# Patient Record
Sex: MALE | Race: WHITE | NOT HISPANIC OR LATINO | Employment: UNEMPLOYED | URBAN - METROPOLITAN AREA
[De-identification: names, ages, dates, MRNs, and addresses within clinical notes are randomized per-mention and may not be internally consistent; named-entity substitution may affect disease eponyms.]

---

## 2021-04-26 ENCOUNTER — PATIENT MESSAGE (OUTPATIENT)
Dept: RESEARCH | Facility: HOSPITAL | Age: 51
End: 2021-04-26

## 2022-06-24 ENCOUNTER — TELEPHONE (OUTPATIENT)
Dept: HEPATOLOGY | Facility: CLINIC | Age: 52
End: 2022-06-24
Payer: MEDICAID

## 2022-06-24 PROBLEM — B19.20 HEPATITIS C TEST POSITIVE: Chronic | Status: ACTIVE | Noted: 2022-06-24

## 2022-06-24 NOTE — TELEPHONE ENCOUNTER
Dr. Jose Khan ordered that patient be scheduled for a hep c consult visit.  Attempt made to schedule patient to see PA Scheuermann. LVM asking that he call hepatology back for scheduling.

## 2022-06-24 NOTE — TELEPHONE ENCOUNTER
----- Message from Shirin Webber RN sent at 6/24/2022  2:07 PM CDT -----  Contact: @[phone]  Yuki Vela,     Should he be scheduled in [facility] or with [facility]?    Thanks,  Shirin  ----- Message -----  From: Mariel Real  Sent: 6/24/2022   1:42 PM CDT  To: Mel Mccall Staff    Pt requesting a call back to schedule a consult visit.  Pt has a referral in the system that is pending review.  Pt would like to be seen at the [facility] location.  Please call to discuss further.

## 2022-06-27 ENCOUNTER — TELEPHONE (OUTPATIENT)
Dept: HEPATOLOGY | Facility: CLINIC | Age: 52
End: 2022-06-27
Payer: MEDICAID

## 2022-06-27 NOTE — TELEPHONE ENCOUNTER
Dr. Jose Khan ordered that patient be scheduled for a hepatology consult visit.  I spoke with patient.  Appt with PA Scheuermann scheduled 7/22/22; appt reminder notice mailed.

## 2022-07-22 ENCOUNTER — PROCEDURE VISIT (OUTPATIENT)
Dept: HEPATOLOGY | Facility: CLINIC | Age: 52
End: 2022-07-22
Payer: MEDICAID

## 2022-07-22 ENCOUNTER — OFFICE VISIT (OUTPATIENT)
Dept: HEPATOLOGY | Facility: CLINIC | Age: 52
End: 2022-07-22
Payer: MEDICAID

## 2022-07-22 ENCOUNTER — LAB VISIT (OUTPATIENT)
Dept: LAB | Facility: HOSPITAL | Age: 52
End: 2022-07-22
Payer: MEDICAID

## 2022-07-22 VITALS
TEMPERATURE: 99 F | BODY MASS INDEX: 27.82 KG/M2 | SYSTOLIC BLOOD PRESSURE: 121 MMHG | HEIGHT: 67 IN | HEART RATE: 88 BPM | RESPIRATION RATE: 18 BRPM | DIASTOLIC BLOOD PRESSURE: 75 MMHG | WEIGHT: 177.25 LBS | OXYGEN SATURATION: 95 %

## 2022-07-22 DIAGNOSIS — B18.2 CHRONIC HEPATITIS C WITHOUT HEPATIC COMA: ICD-10-CM

## 2022-07-22 DIAGNOSIS — B18.2 CHRONIC HEPATITIS C WITHOUT HEPATIC COMA: Primary | ICD-10-CM

## 2022-07-22 DIAGNOSIS — B19.20 HEPATITIS C TEST POSITIVE: ICD-10-CM

## 2022-07-22 PROCEDURE — 99203 OFFICE O/P NEW LOW 30 MIN: CPT | Mod: S$PBB,,, | Performed by: PHYSICIAN ASSISTANT

## 2022-07-22 PROCEDURE — 3008F BODY MASS INDEX DOCD: CPT | Mod: CPTII,,, | Performed by: PHYSICIAN ASSISTANT

## 2022-07-22 PROCEDURE — 3078F DIAST BP <80 MM HG: CPT | Mod: CPTII,,, | Performed by: PHYSICIAN ASSISTANT

## 2022-07-22 PROCEDURE — 3074F SYST BP LT 130 MM HG: CPT | Mod: CPTII,,, | Performed by: PHYSICIAN ASSISTANT

## 2022-07-22 PROCEDURE — 87340 HEPATITIS B SURFACE AG IA: CPT | Performed by: PHYSICIAN ASSISTANT

## 2022-07-22 PROCEDURE — 3074F PR MOST RECENT SYSTOLIC BLOOD PRESSURE < 130 MM HG: ICD-10-PCS | Mod: CPTII,,, | Performed by: PHYSICIAN ASSISTANT

## 2022-07-22 PROCEDURE — 99214 OFFICE O/P EST MOD 30 MIN: CPT | Mod: PBBFAC | Performed by: PHYSICIAN ASSISTANT

## 2022-07-22 PROCEDURE — 99999 PR PBB SHADOW E&M-EST. PATIENT-LVL IV: ICD-10-PCS | Mod: PBBFAC,,, | Performed by: PHYSICIAN ASSISTANT

## 2022-07-22 PROCEDURE — 36415 COLL VENOUS BLD VENIPUNCTURE: CPT | Performed by: PHYSICIAN ASSISTANT

## 2022-07-22 PROCEDURE — 1160F PR REVIEW ALL MEDS BY PRESCRIBER/CLIN PHARMACIST DOCUMENTED: ICD-10-PCS | Mod: CPTII,,, | Performed by: PHYSICIAN ASSISTANT

## 2022-07-22 PROCEDURE — 1159F PR MEDICATION LIST DOCUMENTED IN MEDICAL RECORD: ICD-10-PCS | Mod: CPTII,,, | Performed by: PHYSICIAN ASSISTANT

## 2022-07-22 PROCEDURE — 99203 PR OFFICE/OUTPT VISIT, NEW, LEVL III, 30-44 MIN: ICD-10-PCS | Mod: S$PBB,,, | Performed by: PHYSICIAN ASSISTANT

## 2022-07-22 PROCEDURE — 80321 ALCOHOLS BIOMARKERS 1OR 2: CPT | Performed by: PHYSICIAN ASSISTANT

## 2022-07-22 PROCEDURE — 91200 LIVER ELASTOGRAPHY: CPT | Mod: 26,S$PBB,, | Performed by: PHYSICIAN ASSISTANT

## 2022-07-22 PROCEDURE — 86706 HEP B SURFACE ANTIBODY: CPT | Performed by: PHYSICIAN ASSISTANT

## 2022-07-22 PROCEDURE — 86704 HEP B CORE ANTIBODY TOTAL: CPT | Performed by: PHYSICIAN ASSISTANT

## 2022-07-22 PROCEDURE — 99999 PR PBB SHADOW E&M-EST. PATIENT-LVL IV: CPT | Mod: PBBFAC,,, | Performed by: PHYSICIAN ASSISTANT

## 2022-07-22 PROCEDURE — 3008F PR BODY MASS INDEX (BMI) DOCUMENTED: ICD-10-PCS | Mod: CPTII,,, | Performed by: PHYSICIAN ASSISTANT

## 2022-07-22 PROCEDURE — 86790 VIRUS ANTIBODY NOS: CPT | Performed by: PHYSICIAN ASSISTANT

## 2022-07-22 PROCEDURE — 91200 LIVER ELASTOGRAPHY: CPT | Mod: PBBFAC | Performed by: PHYSICIAN ASSISTANT

## 2022-07-22 PROCEDURE — 4010F ACE/ARB THERAPY RXD/TAKEN: CPT | Mod: CPTII,,, | Performed by: PHYSICIAN ASSISTANT

## 2022-07-22 PROCEDURE — 3044F PR MOST RECENT HEMOGLOBIN A1C LEVEL <7.0%: ICD-10-PCS | Mod: CPTII,,, | Performed by: PHYSICIAN ASSISTANT

## 2022-07-22 PROCEDURE — 3078F PR MOST RECENT DIASTOLIC BLOOD PRESSURE < 80 MM HG: ICD-10-PCS | Mod: CPTII,,, | Performed by: PHYSICIAN ASSISTANT

## 2022-07-22 PROCEDURE — 4010F PR ACE/ARB THEARPY RXD/TAKEN: ICD-10-PCS | Mod: CPTII,,, | Performed by: PHYSICIAN ASSISTANT

## 2022-07-22 PROCEDURE — 1159F MED LIST DOCD IN RCRD: CPT | Mod: CPTII,,, | Performed by: PHYSICIAN ASSISTANT

## 2022-07-22 PROCEDURE — 3044F HG A1C LEVEL LT 7.0%: CPT | Mod: CPTII,,, | Performed by: PHYSICIAN ASSISTANT

## 2022-07-22 PROCEDURE — 91200 FIBROSCAN (VIBRATION CONTROLLED TRANSIENT ELASTOGRAPHY): ICD-10-PCS | Mod: 26,S$PBB,, | Performed by: PHYSICIAN ASSISTANT

## 2022-07-22 PROCEDURE — 1160F RVW MEDS BY RX/DR IN RCRD: CPT | Mod: CPTII,,, | Performed by: PHYSICIAN ASSISTANT

## 2022-07-22 NOTE — PROGRESS NOTES
"HEPATOLOGY CLINIC VISIT NOTE - HCV clinic  REFERRING PROVIDER: Jose Khan MD  CHIEF COMPLAINT: Hepatitis C   (accompanied by: wife Misti)    HISTORY       This is a 52 y.o. White male with chronic hepatitis C, here for further eval / mngmt.     HCV history:  Recently diagnosed  Risks for HCV date back to age 13 (blood transfusions)  - Treatment naive  - Genotype ?  - HCV RNA >15 mill IU/mL - 6/2022    Liver staging:  No formal liver staging   No liver imaging   Labs reveal no obvious evidence of advanced fibrosis; well preserved liver function      Denies jaundice, dark urine, abdominal distention, hematemesis, melena, slowed mentation.   No abnormal skin rashes. No generalized joint pain.     PMH, PSH, SOCIAL HX, FAMILY HX      Reviewed in Epic  Pertinent findings:  FAMILY HX: Unknown - adopted  SOCIAL HX: , resides in [place]  Alcohol - regular/heavy "all his life." Would like to quit. Has realized he needs help to do this      ROS:   Review of Systems   Respiratory: Negative for shortness of breath.    Cardiovascular: Negative for chest pain and leg swelling.   Gastrointestinal: Negative for abdominal pain.   Musculoskeletal: Positive for joint pain.   Skin: Negative for rash.       PHYSICAL EXAM:  Friendly White male, in no acute distress; alert and oriented to person, place and time  VITALS: reviewed  HEENT: Sclerae anicteric.   NECK: Supple  CVS: Regular rate and rhythm. No murmurs  LUNGS: Normal respiratory effort. Clear bilaterally  ABDOMEN: Flat, soft, nontender. No organomegaly or masses. No ascites or hernias. Good bowel sounds.    SKIN: Warm and dry. No jaundice, No obvious rashes.   EXTREMITIES: No lower extremity edema  NEURO/PSYCH: Normal gate. Memory intact. Thought and speech pattern appropriate. Behavior normal. No depression or anxiety noted.    PERTINENT DIAGNOSTIC RESULTS      Lab Results   Component Value Date    WBC 4.5 03/11/2022    HGB 13.6 03/11/2022     03/11/2022 "     No results found for: INR  Lab Results   Component Value Date    AST 29 03/11/2022    ALT 20 03/11/2022    BILITOT 0.4 03/11/2022    ALBUMIN 4.3 03/11/2022    ALKPHOS 54 (L) 09/29/2021    CREATININE 0.99 03/11/2022    BUN 13 03/11/2022     03/11/2022    K 4.5 03/11/2022       ASSESSMENT        52 y.o. White male with:  1. CHRONIC HEPATITIS C, GENOTYPE ? - treatment naive  -- Unknown Immunity to HAV & HBV      PLAN        1. Labs today  2. FibroScan today  3. U/S   4. F/U visit in few weeks. Goal of antiviral therapy    Orders Placed This Encounter   Procedures    FibroScan (Vibration Controlled Transient Elastography)    US Abdomen Complete    Hepatitis B Surface Antigen    Hepatitis B Surface Ab, Qualitative    Hepatitis B Core Antibody, Total    Hepatitis A antibody, IgG    Phosphatidylethanol (PETH)     5. Recommended pt return to PCP to discuss his desire to quit drinking. Recommended he consider AA or rehab as well.   ___________________________________________________________________  EDUCATION:  The natural history of Hepatitis C, including potential progression to cirrhosis was reviewed. We discussed the increased progression of liver disease secondary to alcohol use; patient was advised to avoid alcohol completely.     Transmission of Hepatitis C was reviewed, including possible sexual transmission. Sexual contacts should be screened. Patient should avoid sharing personal products such as razors, toothbrushes, etc.     Recommend avoiding raw seafood.  Limit acetaminophen to 2000mg daily.  __________________________________________________________________    Duration of encounter: 32 MIN  This includes face-to-face time and non face-to-face time preparing to see the patient (eg, review of tests), obtaining and/or reviewing separately obtained history, documenting clinical information in the electronic or other health record, independently interpreting resultsand communicating results to the  patient/family/caregiver, or care coordination.

## 2022-07-22 NOTE — PROCEDURES
FibroScan (Vibration Controlled Transient Elastography)    Date/Time: 7/22/2022 3:00 PM  Performed by: Jennifer B. Scheuermann, PA  Authorized by: Jennifer B. Scheuermann, PA     Diagnosis:  HCV    Probe:  M    Universal Protocol: Patient's identity, procedure and site were verified, confirmatory pause was performed.  Discussed procedure including risks and potential complications.  Questions answered.  Patient verbalizes understanding and wishes to proceed with VCTE.     Procedure: After providing explanations of the procedure, patient was placed in the supine position with right arm in maximum abduction to allow optimal exposure of right lateral abdomen.  Patient was briefly assessed, Testing was performed in the mid-axillary location, 50Hz Shear Wave pulses were applied and the resulting Shear Wave and Propagation Speed detected with a 3.5 MHz ultrasonic signal, using the FibroScan probe, Skin to liver capsule distance and liver parenchyma were accessed during the entire examination with the FibroScan probe, Patient was instructed to breathe normally and to abstain from sudden movements during the procedure, allowing for random measurements of liver stiffness. At least 10 Shear Waves were produced, Individual measurements of each Shear Wave were calculated.  Patient tolerated the procedure well with no complications.  Meets discharge criteria as was dismissed.  Rates pain 0 out of 10.  Patient will follow up with ordering provider to review results.      Findings  Median liver stiffness score:  4.8  CAP Reading: dB/m:  212    IQR/med %:  19  Interpretation  Fibrosis interpretation is based on medial liver stiffness - Kilopascal (kPa).    Fibrosis Stage:  F 0-1  Steatosis interpretation is based on controlled attenuation parameter - (dB/m).    Steatosis Grade:  <S1

## 2022-07-22 NOTE — PATIENT INSTRUCTIONS
Hello again! Nice meeting you today!    Due to your HCV infection, the following things are recommended:  AVOID ALL alcohol (includes beer, wine and liquor)  Tylenol/acetaminophen is OKAY for pain, but no more than 2000 mg per day (up to 4 extra strength)  NO RAW SEAFOOD (sushi, raw oysters) due to the risk of infection    Do not share things that could cut you, such as a razor or toothbrush.  Sexual partners should be screened for HCV.      Plan:  Blood work  Ultrasound  Fibroscan   Follow up video visit    Hopefully we'll have all the info we need to talk about HCV treatment at your next visit!      Jen Scheuermann, PA-C  Hepatology - HCV Clinic

## 2022-07-26 LAB
HAV IGG SER QL IA: NEGATIVE
HBV CORE AB SERPL QL IA: NEGATIVE
HBV SURFACE AB SER-ACNC: POSITIVE M[IU]/ML
HBV SURFACE AG SERPL QL IA: NEGATIVE

## 2022-07-27 LAB
PETH 16:0/18.1 (POPETH): 523 NG/ML
PETH 16:0/18.2 (PLPETH): 332 NG/ML

## 2022-08-08 ENCOUNTER — TELEPHONE (OUTPATIENT)
Dept: HEPATOLOGY | Facility: CLINIC | Age: 52
End: 2022-08-08
Payer: MEDICAID

## 2022-08-08 DIAGNOSIS — K76.9 LIVER DISEASE, UNSPECIFIED: ICD-10-CM

## 2022-08-08 DIAGNOSIS — K76.9 LIVER LESION: ICD-10-CM

## 2022-08-08 DIAGNOSIS — B18.2 CHRONIC HEPATITIS C WITHOUT HEPATIC COMA: Primary | ICD-10-CM

## 2022-08-08 NOTE — TELEPHONE ENCOUNTER
U/S 8/8/22 - mildly complicated liver cyst, 1.2cm    pls call pt  Tell him his ultrasound showed a cyst on his liver.  It does not look alarming, but we need to evaluate it more closely  Schedule tpCT and AFP    Keep upcoming appt w/ me    thanks

## 2022-08-22 ENCOUNTER — TELEPHONE (OUTPATIENT)
Dept: HEPATOLOGY | Facility: CLINIC | Age: 52
End: 2022-08-22
Payer: MEDICAID

## 2022-08-26 ENCOUNTER — TELEPHONE (OUTPATIENT)
Dept: HEPATOLOGY | Facility: CLINIC | Age: 52
End: 2022-08-26
Payer: MEDICAID

## 2022-08-26 NOTE — TELEPHONE ENCOUNTER
Attempt made to reach patient at all numbers listed. I LVM informing him that I noted that he rescheduled testing.  F/u visit with PA Scheuermann moved from 8/29/22 to 9/23/22; appt reminder notice mailed.

## 2022-08-26 NOTE — TELEPHONE ENCOUNTER
"----- Message from Rhona Montejo MA sent at 8/25/2022  4:44 PM CDT -----  Regarding: FW: speak with office  Contact: mika    ----- Message -----  From: Zahida Epstein  Sent: 8/25/2022   3:17 PM CDT  To: Scheuermann Jennifer B Staff  Subject: speak with office                                Consult/Advisory:           Name Of Caller:mika    Contact Preference?:[phone]         Does patient feel the need to be seen today?no           What is the nature of the call?:speak with office about changing appt            Additional Notes:  "Thank you for all that you do for our patients'"       "

## 2022-08-29 ENCOUNTER — TELEPHONE (OUTPATIENT)
Dept: HEPATOLOGY | Facility: CLINIC | Age: 52
End: 2022-08-29
Payer: MEDICAID

## 2022-08-29 DIAGNOSIS — B18.2 CHRONIC HEPATITIS C WITHOUT HEPATIC COMA: Primary | ICD-10-CM

## 2022-08-29 NOTE — TELEPHONE ENCOUNTER
8/29/22  Cr 1.5, CT benign liver cyst    Pls call pt:  CT showed a plain liver cyst noted but no alarming masses or tumors in the liver.  Lab showed his kidney number is up.  VERY important he increase fluids for next few days - goal of 70 oz daily.    Schedule repeat BMP in a week    Keep appt w/ me in Sept thxx

## 2022-08-31 ENCOUNTER — PATIENT MESSAGE (OUTPATIENT)
Dept: HEPATOLOGY | Facility: CLINIC | Age: 52
End: 2022-08-31
Payer: MEDICAID

## 2022-08-31 NOTE — TELEPHONE ENCOUNTER
Attempt made to reach patient.  Msg from PA Scheuermann left on his VM and mailed to him.  Msg also sent via [place].  Lab draw scheduled 9/7/22; appt reminder notice mailed.

## 2022-09-02 ENCOUNTER — OFFICE VISIT (OUTPATIENT)
Dept: HEPATOLOGY | Facility: CLINIC | Age: 52
End: 2022-09-02
Payer: MEDICAID

## 2022-09-02 ENCOUNTER — PATIENT MESSAGE (OUTPATIENT)
Dept: HEPATOLOGY | Facility: CLINIC | Age: 52
End: 2022-09-02

## 2022-09-02 DIAGNOSIS — B18.2 CHRONIC HEPATITIS C WITHOUT HEPATIC COMA: Primary | ICD-10-CM

## 2022-09-02 PROCEDURE — 1159F MED LIST DOCD IN RCRD: CPT | Mod: CPTII,95,, | Performed by: PHYSICIAN ASSISTANT

## 2022-09-02 PROCEDURE — 3044F PR MOST RECENT HEMOGLOBIN A1C LEVEL <7.0%: ICD-10-PCS | Mod: CPTII,95,, | Performed by: PHYSICIAN ASSISTANT

## 2022-09-02 PROCEDURE — 3044F HG A1C LEVEL LT 7.0%: CPT | Mod: CPTII,95,, | Performed by: PHYSICIAN ASSISTANT

## 2022-09-02 PROCEDURE — 99214 OFFICE O/P EST MOD 30 MIN: CPT | Mod: 95,,, | Performed by: PHYSICIAN ASSISTANT

## 2022-09-02 PROCEDURE — 1160F PR REVIEW ALL MEDS BY PRESCRIBER/CLIN PHARMACIST DOCUMENTED: ICD-10-PCS | Mod: CPTII,95,, | Performed by: PHYSICIAN ASSISTANT

## 2022-09-02 PROCEDURE — 99214 PR OFFICE/OUTPT VISIT, EST, LEVL IV, 30-39 MIN: ICD-10-PCS | Mod: 95,,, | Performed by: PHYSICIAN ASSISTANT

## 2022-09-02 PROCEDURE — 4010F ACE/ARB THERAPY RXD/TAKEN: CPT | Mod: CPTII,95,, | Performed by: PHYSICIAN ASSISTANT

## 2022-09-02 PROCEDURE — 4010F PR ACE/ARB THEARPY RXD/TAKEN: ICD-10-PCS | Mod: CPTII,95,, | Performed by: PHYSICIAN ASSISTANT

## 2022-09-02 PROCEDURE — 1159F PR MEDICATION LIST DOCUMENTED IN MEDICAL RECORD: ICD-10-PCS | Mod: CPTII,95,, | Performed by: PHYSICIAN ASSISTANT

## 2022-09-02 PROCEDURE — 1160F RVW MEDS BY RX/DR IN RCRD: CPT | Mod: CPTII,95,, | Performed by: PHYSICIAN ASSISTANT

## 2022-09-02 RX ORDER — VELPATASVIR AND SOFOSBUVIR 100; 400 MG/1; MG/1
1 TABLET, FILM COATED ORAL DAILY
Qty: 28 TABLET | Refills: 2 | Status: SHIPPED | OUTPATIENT
Start: 2022-09-02 | End: 2023-03-08 | Stop reason: ALTCHOICE

## 2022-09-02 NOTE — PROGRESS NOTES
"HEPATOLOGY VISIT NOTE - HCV clinic  The patient location is: [place]  Visit type: audiovisual    Each patient to whom he or she provides medical services by telemedicine is:  (1) informed of the relationship between the physician and patient and the respective role of any other health care provider with respect to management of the patient; and (2) notified that he or she may decline to receive medical services by telemedicine and may withdraw from such care at any time.    CHIEF COMPLAINT: Hepatitis C       HISTORY       This is a 52 y.o. White male with chronic hepatitis C and regular alcohol use    Interval history:  Being seen for f/u w/ additional labs, imaging, liver staging.  (+) immunity to HBV (prior vaccination, neg HBcAb)  Lacking immunity to HAV  Peth 7/22/22  No evidence of advanced fibrosis.    U/S 7/2022 - 1.2cm mildly complicated liver cyst  tpCT 8/29/22 - 1.1cm benign liver cyst  AFP normal  *Of note CR 1.5 day of CT, has been advised to increase fluids and have repeat BMP next week. 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    Has upcoming appt w/ PCP, plans to discuss his desire to quit alcohol    Feels well  Motivated to have HCV treated  Denies jaundice, dark urine, hematemesis, melena, slowed mentation, abdominal distention.       HCV history:  Recently diagnosed  Risks for HCV date back to age 13 (blood transfusions)  - Treatment naive  - Genotype ?  - HCV RNA >15 mill IU/mL - 6/2022    Liver staging:  FibroScan 7/2022 - kPa 4.8 (F0-1), (, no steatosis)  Labs and imaging support staging        PMH, PSH, SOCIAL HX, FAMILY HX      Reviewed in Epic  Pertinent findings:  FAMILY HX: Unknown - adopted  SOCIAL HX: , resides in [place]  Alcohol - regular/heavy "all his life." Would like to quit. Has realized he needs help to do this      ROS:   Review of Systems   Respiratory:  Negative for shortness of breath.    Cardiovascular:  Negative for chest pain and leg swelling.   Gastrointestinal:  " Negative for abdominal pain.   Musculoskeletal:  Positive for back pain and joint pain.   Skin:  Negative for rash.     PHYSICAL EXAM:  Friendly White male, in no acute distress; alert and oriented to person, place and time  LUNGS: Normal respiratory effort.  NEURO/PSYCH: Memory intact. Thought and speech pattern appropriate. Behavior normal. No depression or anxiety noted.      PERTINENT DIAGNOSTIC RESULTS      Lab Results   Component Value Date    WBC 4.5 03/11/2022    HGB 13.6 03/11/2022     03/11/2022     No results found for: INR  Lab Results   Component Value Date    AST 29 03/11/2022    ALT 20 03/11/2022    BILITOT 0.4 03/11/2022    ALBUMIN 4.3 03/11/2022    ALKPHOS 54 (L) 09/29/2021    CREATININE 1.5 (H) 08/29/2022    BUN 13 03/11/2022     03/11/2022    K 4.5 03/11/2022    AFP 5.4 08/29/2022     US ABDOMEN COMPLETE - 8/8/22  Impression:   Mildly complicated left hepatic cyst.  No suspicious hepatic lesions.    CT ABDOMEN W WO CONTRAST - 8/29/22  Impression:   Benign small liver cyst corresponding to the cyst noted on ultrasound 08/08/2022.  No worrisome liver lesions.      ASSESSMENT        52 y.o. White male with:  1. CHRONIC HEPATITIS C, GENOTYPE ? - treatment naive  -- FibroScan F0-1  -- lacking Immunity to HAV   -- prior HBV vaccine w/ immunity    2. LIVER CYST - benign, no f/u needed  3. HEAVY ALCOHOL USE - aware he should quit and has desire, will talk to PCP      PLAN        1. Obtain authorization to treat HCV with Epclusa x 12 weeks  -- Rx will be routed to [facility]  -- Patient will notify me of exact treatment start date so appropriate lab f/u can be scheduled.    2. HAV vaccine - pt to ask PCP  3. Increase fluids. BMP next week as scheduled  4. Commended desire to quit alcohol and plans to discuss w/ PCP    ______________________________________________________________________________  EDUCATION:  HCV RX  Discussed goal of HCV eradication to prevent progression of  liver disease.  Discussed use of Epclusa daily x 12 weeks w/ potential side effects of fatigue and headache.     Reviewed limitations on acid suppressant medications due to DDI w/ Epclusa:  -- Antacids, H2 Receptor Antagonist, PPI - Pt not taking  Patient instructed to contact me if experiencing acid related symptoms so further recommendations can be made regarding acid suppression therapy.      Reviewed limitations on statin therapy and Epclusa:  -- Patient taking simvastatin 10mg. Can continue    Herbal / alternative therapies must be discontinued  Discussed importance of medication adherence and risk of treatment failure / viral resistance if not adherent. Pt has verbalized understanding.    __________________________________________________________________    Duration of encounter: 35 MIN  This includes face-to-face time and non face-to-face time preparing to see the patient (eg, review of tests), obtaining and/or reviewing separately obtained history, documenting clinical information in the electronic or other health record, independently interpreting resultsand communicating results to the patient/family/caregiver, or care coordination.

## 2022-09-08 ENCOUNTER — SPECIALTY PHARMACY (OUTPATIENT)
Dept: PHARMACY | Facility: CLINIC | Age: 52
End: 2022-09-08
Payer: MEDICAID

## 2022-09-08 NOTE — TELEPHONE ENCOUNTER
New Rx received for Epclusa x 12 weeks. No PA required. Test claim: $0 copay. Pt has [place] Medicaid. No BI/FA needed.      Pt to ED c.o BLE swelling worse today +numbness and tingling, was hospitalized last week for GI bleed. Denies SOB, dizziness. AXOX4.

## 2022-09-09 ENCOUNTER — PATIENT MESSAGE (OUTPATIENT)
Dept: PHARMACY | Facility: CLINIC | Age: 52
End: 2022-09-09
Payer: MEDICAID

## 2022-09-13 ENCOUNTER — SPECIALTY PHARMACY (OUTPATIENT)
Dept: PHARMACY | Facility: CLINIC | Age: 52
End: 2022-09-13
Payer: MEDICAID

## 2022-09-13 ENCOUNTER — TELEPHONE (OUTPATIENT)
Dept: HEPATOLOGY | Facility: CLINIC | Age: 52
End: 2022-09-13
Payer: MEDICAID

## 2022-09-13 DIAGNOSIS — B18.2 CHRONIC HEPATITIS C WITHOUT HEPATIC COMA: Primary | ICD-10-CM

## 2022-09-13 DIAGNOSIS — B19.20 HEPATITIS C TEST POSITIVE: Primary | ICD-10-CM

## 2022-09-13 NOTE — TELEPHONE ENCOUNTER
Specialty Pharmacy - Initial Clinical Assessment -  Epclusa fill 1 of 3    Specialty Medication Orders Linked to Encounter      Flowsheet Row Most Recent Value   Medication #1 sofosbuvir-velpatasvir 400-100 mg Tab (Order#[id number], Rx#[id number])          Patient Diagnosis   B19.20 - Hepatitis C test positive    Subjective    Gen Duron is a 52 y.o. male, who is followed by the specialty pharmacy service for management and education.    Recent Encounters       Date Type Provider Description    09/13/2022 Specialty Pharmacy Cortney Banuelos PharmD Initial Clinical Assessment    09/08/2022 Specialty Pharmacy Cortney Banuelos PharmD Referral Authorization          Clinical call attempts since last clinical assessment   9/9/2022  4:10 PM - Specialty Pharmacy - Clinical Assessment by Cortney Banuelos PharmD  9/9/2022  4:11 PM - Specialty Pharmacy - Clinical Assessment by Cortney Banuelos PharmD  9/12/2022  6:58 PM - Specialty Pharmacy - Clinical Assessment by Cortney Banuelos PharmD     Current Outpatient Medications   Medication Sig    amLODIPine (NORVASC) 10 MG tablet Take 1 tablet (10 mg total) by mouth once daily.    cyclobenzaprine (FLEXERIL) 10 MG tablet     EScitalopram oxalate (LEXAPRO) 10 MG tablet Take 1 tablet (10 mg total) by mouth once daily.    folic acid-vit B6-vit B12 2.5-25-2 mg (FOLBIC OR EQUIV) 2.5-25-2 mg Tab Take 1 tablet by mouth once daily.    lisinopriL 10 MG tablet Take 1 tablet (10 mg total) by mouth once daily.    oxybutynin (DITROPAN XL) 10 MG 24 hr tablet Take 1 tablet (10 mg total) by mouth every evening.    simvastatin (ZOCOR) 10 MG tablet Take 1 tablet (10 mg total) by mouth every evening.    sofosbuvir-velpatasvir 400-100 mg Tab Take 1 tablet by mouth once daily.    thiamine 100 MG tablet Take 1 tablet (100 mg total) by mouth once daily.   Last reviewed on 9/13/2022  1:31 PM by Cortney Banuelos, Mimi    Review of patient's allergies indicates:   Allergen Reactions     Penicillins Other (See Comments)   Last reviewed on  9/2/2022 2:28 PM by Jennifer B Scheuermann    Drug Interactions    Drug interactions evaluated: yes  Clinically relevant drug interactions identified: no  Provided the patient with educational material regarding drug interactions: not applicable           Assessment Questions - Documented Responses      Flowsheet Row Most Recent Value   Assessment    Medication Reconciliation completed for patient Yes   During the past 4 weeks, has patient missed any activities due to condition or medication? No   During the past 4 weeks, did patient have any of the following urgent care visits? None   Goals of Therapy Status Discussed (new start)   Status of the patients ability to self-administer: Is Able   All education points have been covered with patient? Yes, supplemental printed education provided   Welcome packet contents reviewed and discussed with patient? Yes   Assesment completed? Yes   Plan Therapy being initiated   Do you need to open a clinical intervention (i-vent)? No   Do you want to schedule first shipment? Yes   Medication #1 Assessment Info    Patient status New medication, New to [facility]   Is this medication appropriate for the patient? Yes   Is this medication effective? Not yet started          Refill Questions - Documented Responses      Flowsheet Row Most Recent Value   Patient Availability and HIPAA Verification    Does patient want to proceed with activity? Yes   HIPAA/medical authority confirmed? Yes   Relationship to patient of person spoken to? Self   Refill Screening Questions    When does the patient need to receive the medication? 09/15/22   Refill Delivery Questions    How will the patient receive the medication? Delivery Monika   When does the patient need to receive the medication? 09/15/22   Shipping Address Home   Address in [facility] confirmed and updated if neccessary? Yes   Expected Copay ($) 0   Is the patient able to afford the  "medication copay? Yes   Payment Method zero copay   Days supply of Refill 28   Supplies needed? No supplies needed   Refill activity completed? Yes   Refill activity plan Refill scheduled   Shipment/Pickup Date: 09/14/22            Objective    He has a past medical history of Alcohol abuse, GERD (gastroesophageal reflux disease), Hypertension, and Substance abuse.    Tried/failed medications: None    BP Readings from Last 4 Encounters:   07/22/22 121/75   06/23/22 115/76   05/06/22 (!) 145/88   03/25/22 (!) 132/94     Ht Readings from Last 4 Encounters:   07/22/22 5' 7" (1.702 m)   06/23/22 5' 7" (1.702 m)   03/25/22 5' 7" (1.702 m)   02/03/22 5' 7" (1.702 m)     Wt Readings from Last 4 Encounters:   07/22/22 80.4 kg (177 lb 4 oz)   06/23/22 82.5 kg (181 lb 12.8 oz)   03/25/22 80.7 kg (178 lb)   02/03/22 84 kg (185 lb 3.2 oz)     No results found for: HCVGENOTYPE  Recent Labs   Lab Result Units 08/29/22  1451 07/22/22  1615 06/21/22  1207   Creatinine mg/dL 1.5 H  --   --    HCV RNA Quant PCR Log IU/mL  --   --  7.18 H   Hepatitis B Surface Ag   --  Negative  --    Hep B S Ab   --  Positive  --    Hep B Core Total Ab   --  Negative  --      The goals of Hepatitis C Virus (HCV) infection treatment include:  Reducing all-cause mortality and liver-related health adverse consequences, including cirrhosis, end-stage liver disease, and hepatocellular carcinoma  Achieving virologic cure as evidenced by a sustained virologic response  Improving or maintaining quality of life  Maintaining optimal therapy adherence  Minimizing and managing side effects  Preventing the transmission of HCV      Goals of Therapy Status: Discussed (new start)    Epclusa dosage is appropriate for diagnosis; comorbidities, allergies and medical history on file reviewed. Medication list reviewed; no DDI's on initial review.  Diagnosis: Chronic hepatitis C without hepatic coma (B18.2)  Weeks: 12  Genotype: Unknown  HCV RNA: 15 mill IU/mL - " 6/2022  Fibrosis: None, FibroScan 7/2022 - kPa 4.8 (F0-1), (, no steatosis)  CP: A score of 5  Renal: eGFR= 58 mL/min  Treatment: naive  HBV: HBsAb (+) immunity to HBV (prior vaccination, neg HBcAb), Lacking immunity to HAV, HIV (-)  Appropriate based on AASLD guidelines: Yes    Assessment/Plan  Patient plans to start therapy on 09/15/22      Indication, dosage, appropriateness, effectiveness, safety and convenience of his specialty medication(s) were reviewed today.     Patient Education   Patient received education on the following:   Expectations and possible outcomes of therapy  Proper use, timely administration, and missed dose management  Duration of therapy  Side effects, including prevention, minimization, and management  Contraindications and safety precautions  New or changed medications, including prescribe and over the counter medications and supplements  Reviews recommended vaccinations, as appropriate  Storage, safe handling, and disposal    -Patient plans to take Epclusa with his evening medications daily.   -Patient inquired about alcohol use while on Epclusa. Informed the pt that while alcohol does not directly interact with Epclusa or impact his chances of treatment success, it is recommended that he avoid alcohol to prevent further harm to his liver. Pt expressed understanding and stated that he is committed to trying to stop drinking.     Tasks added this encounter   10/6/2022 - Refill Call (Auto Added)  9/22/2022 - 7 Day Post Start Touchbase   Tasks due within next 3 months   No tasks due.     Cortney Banuelos, PharmD  [facility]  [address] [place] [id number]  Phone: [phone]  Fax: [phone]

## 2022-09-13 NOTE — TELEPHONE ENCOUNTER
9/13/22 labs: BMP stable. Cr down from 1.5 to 1.0    Pls tell pt kidney number back to normal now  Labs look fine

## 2022-09-13 NOTE — TELEPHONE ENCOUNTER
Pt beginning 12 weeks of Epclusa on 9/15/22  Anticipated treatment end date: 12/7/22  Teresita ?  Treatment naive  F0-1    Pls update episode and schedule:  - LFT, HCV RNA at week 6 -   - visit in 2 months  - LFT, HCV RNA - SVR12 - 3/7/23

## 2022-09-14 NOTE — TELEPHONE ENCOUNTER
Attempt made to reach patient.  Msg from PA Scheuermann left on his VM and mailed to him.  Labs scheduled 10/26/22 and 3/7/23.  F/u visit scheduled 11/11/22; appt reminder notices mailed.

## 2022-09-22 ENCOUNTER — SPECIALTY PHARMACY (OUTPATIENT)
Dept: PHARMACY | Facility: CLINIC | Age: 52
End: 2022-09-22
Payer: MEDICAID

## 2022-10-10 ENCOUNTER — SPECIALTY PHARMACY (OUTPATIENT)
Dept: PHARMACY | Facility: CLINIC | Age: 52
End: 2022-10-10
Payer: MEDICAID

## 2022-10-10 NOTE — TELEPHONE ENCOUNTER
Specialty Pharmacy - Refill Coordination -  Epclusa fill 2 of 3    Specialty Medication Orders Linked to Encounter      Flowsheet Row Most Recent Value   Medication #1 sofosbuvir-velpatasvir 400-100 mg Tab (Order#[id number], Rx#[id number])          Epclusa refill (2 of 3) confirmed and reassessment complete. Confirmed 2 patient identifiers - name and . Therapy appropriate.     Patient has 3 doses of Epclusa remaining and takes it around 10:30-11 AM daily. Pt reports they are not having any side effects so far. No missed doses, no new medications, no new allergies or health conditions reported at this time. Allergies reviewed and medication reconciliation complete (reviewed and documented in [facility] and [facility]). Patient has NOT needed to take anything for heartburn but is aware that they may take Tums or a similar antacid if needed as long as it is  from Epclusa by at least 4 hours.  Disease education reviewed (including transmission and prevention). Patient counseled on importance of maintaining adherence and keeping lab appointments which were scheduled. All questions answered and addressed to patients satisfaction. Advised to call [facility] and provider if any issues arise.  Pt verbalized understanding.      Refill Questions - Documented Responses      Flowsheet Row Most Recent Value   Patient Availability and HIPAA Verification    Does patient want to proceed with activity? Yes   HIPAA/medical authority confirmed? Yes   Relationship to patient of person spoken to? Self   Refill Screening Questions    Changes to allergies? No   Changes to medications? No   New conditions since last clinic visit? No   Unplanned office visit, urgent care, ED, or hospital admission in the last 4 weeks? No   How does patient/caregiver feel medication is working? Too soon to tell   Financial problems or insurance changes? No   How many doses of your specialty medications were missed in the last 4 weeks? 0    Would patient like to speak to a pharmacist? No   When does the patient need to receive the medication? 10/13/22   Refill Delivery Questions    How will the patient receive the medication? MEDRx   When does the patient need to receive the medication? 10/13/22   Shipping Address Home   Address in [facility] confirmed and updated if neccessary? Yes   Expected Copay ($) 0   Is the patient able to afford the medication copay? Yes   Payment Method zero copay   Days supply of Refill 28   Supplies needed? No supplies needed   Refill activity completed? Yes   Refill activity plan Refill scheduled   Shipment/Pickup Date: 10/11/22            Current Outpatient Medications   Medication Sig    amLODIPine (NORVASC) 10 MG tablet Take 1 tablet (10 mg total) by mouth once daily.    cyclobenzaprine (FLEXERIL) 10 MG tablet     EScitalopram oxalate (LEXAPRO) 10 MG tablet Take 1 tablet (10 mg total) by mouth once daily.    folic acid-vit B6-vit B12 2.5-25-2 mg (FOLBIC OR EQUIV) 2.5-25-2 mg Tab Take 1 tablet by mouth once daily.    lisinopriL 10 MG tablet Take 1 tablet (10 mg total) by mouth once daily.    oxybutynin (DITROPAN XL) 10 MG 24 hr tablet Take 1 tablet (10 mg total) by mouth every evening.    simvastatin (ZOCOR) 10 MG tablet Take 1 tablet (10 mg total) by mouth every evening.    sofosbuvir-velpatasvir 400-100 mg Tab Take 1 tablet by mouth once daily.    thiamine 100 MG tablet Take 1 tablet (100 mg total) by mouth once daily.   Last reviewed on 9/13/2022  1:31 PM by Cortney Banuelos, Mimi    Review of patient's allergies indicates:   Allergen Reactions    Penicillins Other (See Comments)    Last reviewed on  9/2/2022 2:28 PM by Jennifer B Scheuermann      Tasks added this encounter   11/3/2022 - Refill Call (Auto Added)   Tasks due within next 3 months   No tasks due.     Cortney Baunelos, PharmD  [facility]  [address] [place] [id number]  Phone: [phone]  Fax:  [phone]

## 2022-11-07 ENCOUNTER — SPECIALTY PHARMACY (OUTPATIENT)
Dept: PHARMACY | Facility: CLINIC | Age: 52
End: 2022-11-07
Payer: MEDICAID

## 2022-11-07 NOTE — TELEPHONE ENCOUNTER
Specialty Pharmacy - Refill Coordination    Specialty Medication Orders Linked to Encounter      Flowsheet Row Most Recent Value   Medication #1 sofosbuvir-velpatasvir 400-100 mg Tab (Order#[id number], Rx#[id number])        Epclusa refill (3 of 3) confirmed and reassessment complete.     Patient has 3 doses of Epclusa remaining and takes it around 10 pm daily. Pt reports they are not having any side effects at this time. No missed doses, no new medications, no new allergies or health conditions reported at this time. Allergies reviewed and medication reconciliation complete (reviewed and documented in [facility] and [facility]). Disease education reviewed (including transmission and prevention). Patient counseled on importance of maintaining adherence and keeping lab appointments which were scheduled. All questions answered and addressed to patients satisfaction. Advised to call [facility] and provider if any issues arise.       Refill Questions - Documented Responses      Flowsheet Row Most Recent Value   Patient Availability and HIPAA Verification    Does patient want to proceed with activity? Yes   HIPAA/medical authority confirmed? Yes   Relationship to patient of person spoken to? Self   Refill Screening Questions    Changes to allergies? No   Changes to medications? No   New conditions since last clinic visit? No   Unplanned office visit, urgent care, ED, or hospital admission in the last 4 weeks? No   How does patient/caregiver feel medication is working? Good   Financial problems or insurance changes? No   How many doses of your specialty medications were missed in the last 4 weeks? 0   Would patient like to speak to a pharmacist? No   When does the patient need to receive the medication? 11/10/22   Refill Delivery Questions    How will the patient receive the medication? MEDRx   When does the patient need to receive the medication? 11/10/22   Shipping Address Home   Address in [facility]  confirmed and updated if neccessary? Yes   Expected Copay ($) 0   Is the patient able to afford the medication copay? Yes   Payment Method zero copay   Days supply of Refill 28   Supplies needed? No supplies needed   Refill activity completed? Yes   Refill activity plan Refill scheduled   Shipment/Pickup Date: 11/08/22            Current Outpatient Medications   Medication Sig    amLODIPine (NORVASC) 10 MG tablet Take 1 tablet (10 mg total) by mouth once daily.    cyclobenzaprine (FLEXERIL) 10 MG tablet     EScitalopram oxalate (LEXAPRO) 10 MG tablet Take 1 tablet (10 mg total) by mouth once daily.    folic acid-vit B6-vit B12 2.5-25-2 mg (FOLBIC OR EQUIV) 2.5-25-2 mg Tab Take 1 tablet by mouth once daily.    lisinopriL 10 MG tablet Take 1 tablet (10 mg total) by mouth once daily.    oxybutynin (DITROPAN XL) 10 MG 24 hr tablet Take 1 tablet (10 mg total) by mouth every evening.    simvastatin (ZOCOR) 10 MG tablet Take 1 tablet (10 mg total) by mouth every evening.    sofosbuvir-velpatasvir 400-100 mg Tab Take 1 tablet by mouth once daily.    thiamine 100 MG tablet Take 1 tablet (100 mg total) by mouth once daily.   Last reviewed on 9/13/2022  1:31 PM by Cortney Banuelos, Mimi    Review of patient's allergies indicates:   Allergen Reactions    Penicillins Other (See Comments)    Last reviewed on  10/18/2022 1:58 PM by Jose Khan      Tasks added this encounter   No tasks added.   Tasks due within next 3 months   11/3/2022 - Refill Call (Auto Added)     Renetta Roche, PharmD  [facility]  [address] [place] [id number]  Phone: [phone]  Fax: [phone]

## 2022-11-11 ENCOUNTER — TELEPHONE (OUTPATIENT)
Dept: HEPATOLOGY | Facility: CLINIC | Age: 52
End: 2022-11-11
Payer: MEDICAID

## 2022-11-11 NOTE — TELEPHONE ENCOUNTER
Patient and no-show for scheduled appt with PA Scheuermann on 11/11/22.  Letter sent asking that he contact hepatology for rescheduling.

## 2023-03-08 ENCOUNTER — TELEPHONE (OUTPATIENT)
Dept: HEPATOLOGY | Facility: CLINIC | Age: 53
End: 2023-03-08
Payer: MEDICAID

## 2023-03-08 DIAGNOSIS — Z86.19 HEPATITIS C VIRUS INFECTION CURED AFTER ANTIVIRAL DRUG THERAPY: Primary | ICD-10-CM

## 2023-03-08 PROBLEM — B19.20 HEPATITIS C TEST POSITIVE: Chronic | Status: RESOLVED | Noted: 2022-06-24 | Resolved: 2023-03-08

## 2023-03-09 NOTE — TELEPHONE ENCOUNTER
Attempt made to reach patient.  Msg from PA Scheuermann left on his VM and mailed to him.  Lab draw scheduled 9/5/23; appt reminder notice mailed.

## 2023-03-09 NOTE — TELEPHONE ENCOUNTER
Pt began 12 weeks of Epclusa on 9/15/22  Anticipated treatment end date: 12/7/22  [place] ?  Treatment naive  F0-1    3/7/23  HCV Neg  LFT normal    These labs document [id number] following successful HCV treatment with Epclusa      please tell patient:  1.) Lab test shows there is NO Hepatitis C in the blood. This means the Hepatitis C is cured!!  We do not expect the virus to return.  This does not give protection from Hepatitis C and patient could be infected again if ever exposed to the virus again.        Please schedule   - HCV RNA in 6 months
